# Patient Record
Sex: FEMALE | Race: WHITE | NOT HISPANIC OR LATINO | Employment: PART TIME | URBAN - METROPOLITAN AREA
[De-identification: names, ages, dates, MRNs, and addresses within clinical notes are randomized per-mention and may not be internally consistent; named-entity substitution may affect disease eponyms.]

---

## 2024-07-03 ENCOUNTER — NURSE TRIAGE (OUTPATIENT)
Dept: CALL CENTER | Facility: HOSPITAL | Age: 19
End: 2024-07-03
Payer: MEDICAID

## 2024-07-03 NOTE — TELEPHONE ENCOUNTER
"Reason for Disposition  • Nursing judgment    Additional Information  • Negative: Sounds like a life-threatening emergency to the triager  • Negative: Information only call about a Well Adult (no illness or injury)  • Negative: Caller can't be reached by phone  • Negative: Nursing judgment  • Negative: Nursing judgment  • Negative: Nursing judgment  • Negative: Nursing judgment  • Negative: Nursing judgment  • Negative: Nursing judgment  • Negative: Patient wants to be seen  • Negative: Nursing judgment  • Negative: Nursing judgment  • Negative: Nursing judgment  • Negative: Nursing judgment    Answer Assessment - Initial Assessment Questions  1. REASON FOR CALL: \"What is your main concern right now?\"      Want physical   2. ONSET: \"When did the  start?\"      months  3. SEVERITY: \"How bad is the *No Answer*?\"      na  4. FEVER: \"Do you have a fever?\"      na  5. OTHER SYMPTOMS: \"Do you have any other new symptoms?\"      Dizziness and ha  6. TREATMENTS AND RESPONSE: \"What have you done so far to try to make this better? What medicines have you used?\"      na  7. PREGNANCY: \"Is there any chance you are pregnant?\" \"When was your last menstrual period?\"      na    Protocols used: No Protocol Available-ADULT-[place]    "

## 2024-07-15 ENCOUNTER — OFFICE VISIT (OUTPATIENT)
Dept: FAMILY MEDICINE CLINIC | Facility: CLINIC | Age: 19
End: 2024-07-15
Payer: MEDICAID

## 2024-07-15 VITALS
OXYGEN SATURATION: 100 % | HEART RATE: 96 BPM | DIASTOLIC BLOOD PRESSURE: 72 MMHG | WEIGHT: 124.2 LBS | BODY MASS INDEX: 21.21 KG/M2 | TEMPERATURE: 97.6 F | HEIGHT: 64 IN | SYSTOLIC BLOOD PRESSURE: 112 MMHG

## 2024-07-15 DIAGNOSIS — D64.9 ANEMIA, UNSPECIFIED TYPE: ICD-10-CM

## 2024-07-15 DIAGNOSIS — I73.00 RAYNAUD'S PHENOMENON WITHOUT GANGRENE: ICD-10-CM

## 2024-07-15 DIAGNOSIS — Z13.29 SCREENING FOR THYROID DISORDER: ICD-10-CM

## 2024-07-15 DIAGNOSIS — Z11.59 NEED FOR HEPATITIS C SCREENING TEST: ICD-10-CM

## 2024-07-15 DIAGNOSIS — F41.9 ANXIETY: ICD-10-CM

## 2024-07-15 DIAGNOSIS — Z76.89 ENCOUNTER TO ESTABLISH CARE: Primary | ICD-10-CM

## 2024-07-15 PROCEDURE — 99204 OFFICE O/P NEW MOD 45 MIN: CPT | Performed by: PHYSICIAN ASSISTANT

## 2024-07-15 PROCEDURE — 1126F AMNT PAIN NOTED NONE PRSNT: CPT | Performed by: PHYSICIAN ASSISTANT

## 2024-07-15 RX ORDER — NORETHINDRONE ACETATE AND ETHINYL ESTRADIOL AND FERROUS FUMARATE 1.5-30(21)
KIT ORAL
COMMUNITY
Start: 2024-05-21

## 2024-07-15 NOTE — PROGRESS NOTES
New Patient Office Visit      Date: 07/15/2024   Patient Name: Prashanth Varela  : 2005   MRN: [id number]     Chief Complaint:    Chief Complaint   Patient presents with    Establish Care       History of Present Illness: Prashanth Varela is a 18 y.o. female who is here today to establish care.    History of Present Illness  Currently, she does not have a primary care physician because she was previously seen a pediatrician.   She has chronic stomach issues, including persistent nausea. Despite medication, her symptoms have not improved. She has consulted a GI specialist at , who conducted colonoscopy and endoscopy, but did not find anything. She finds it challenging to gain weight, but she has not lost weight recently. She vomits a few times a week. Her mother suspects her nausea may be related to blood sugar levels, as she often vomits even after eating. .    She occasionally experiences a rapid heart rate, followed by a brief drop in heart rate, and numbness in her arm. She has a history of anxiety, but is not currently on any medication. She is aware of potential symptoms for her anxiety, including difficulty breathing and chest pain while sitting in a car, which can last up to 10 minutes. These episodes are never associated with physical activity. She is not currently seeing a counselor due to missing several appointments. She denies depression.    She experiences frequent dizziness, which has been ongoing for over 4 years. Recently, she has been experiencing severe headaches, which feels like it is behind her eyes and sometimes it hurts her neck. Sometimes, she has to close her eyes and sit for a minute if she is standing for too long. She recently had her eyes checked and got glasses, but she does not think they are the right prescription. She suspects anemia may be causing her dizziness. Her legs turn blue and purple when standing, and her feet looked discolored and cold or hot temperatures.  She maintains good hydration and has taken iron and multivitamins once, but did not notice any difference. She occasionally takes multivitamins.       Subjective      Review of Systems:   Review of Systems   Constitutional:  Negative for unexpected weight gain and unexpected weight loss.   Eyes:  Negative for blurred vision, pain and visual disturbance.        She is up-to-date on eye exam   Gastrointestinal:  Positive for nausea (Chronic, and currently stable). Negative for vomiting.   Genitourinary:  Negative for menstrual problem.   Musculoskeletal:  Positive for neck pain. Negative for arthralgias and joint swelling.   Neurological:  Positive for light-headedness (Upon standing) and headache. Negative for syncope and numbness.   Psychiatric/Behavioral:  The patient is nervous/anxious.        Past Medical History: History reviewed. No pertinent past medical history.    Past Surgical History: History reviewed. No pertinent surgical history.    Family History:   Family History   Problem Relation Age of Onset    Melanoma Mother     No Known Problems Father     Crohn's disease Sister     Ulcerative colitis Sister     Diabetes Paternal Grandmother     Diabetes Paternal Grandfather        Social History:   Social History     Socioeconomic History    Marital status: Single   Tobacco Use    Smoking status: Never    Smokeless tobacco: Never   Vaping Use    Vaping status: Never Used   Substance and Sexual Activity    Alcohol use: Never    Drug use: Yes     Types: Marijuana    Sexual activity: Never       Medications:     Current Outpatient Medications:     Junel FE 1.5/30 1.5-30 MG-MCG tablet, , Disp: , Rfl:     Allergies:   No Known Allergies    Objective     Physical Exam:  Vital Signs:   Vitals:    07/15/24 1515 07/15/24 1546   BP: 136/76 112/72   BP Location: Left arm Left arm   Patient Position: Sitting Sitting   Cuff Size: Adult    Pulse: 96    Temp: 97.6 °F (36.4 °C)    TempSrc: Infrared    SpO2: 100%    Weight:  "56.3 kg (124 lb 3.2 oz)    Height: 162.6 cm (64\")    PainSc: 0-No pain      Body mass index is 21.32 kg/m².  Pediatric BMI = 46 %ile (Z= -0.11) based on CDC (Girls, 2-20 Years) BMI-for-age based on BMI available as of 7/15/2024.. BMI is within normal parameters. No other follow-up for BMI required.       Physical Exam  Vitals (Blood pressure mildly elevated at the beginning of the visit, but it improved significantly when repeated) and nursing note reviewed.   Constitutional:       General: She is not in acute distress.     Appearance: Normal appearance. She is not ill-appearing.   HENT:      Head: Normocephalic and atraumatic.   Eyes:      Extraocular Movements: Extraocular movements intact.      Conjunctiva/sclera: Conjunctivae normal.      Pupils: Pupils are equal, round, and reactive to light.   Cardiovascular:      Rate and Rhythm: Normal rate and regular rhythm.      Pulses: Normal pulses.      Heart sounds: Normal heart sounds.   Pulmonary:      Effort: Pulmonary effort is normal. No respiratory distress.      Breath sounds: Normal breath sounds.   Musculoskeletal:      Right lower leg: No edema.      Left lower leg: No edema.   Skin:     General: Skin is warm and dry.   Neurological:      General: No focal deficit present.      Mental Status: She is alert and oriented to person, place, and time.      Coordination: Coordination normal.      Gait: Gait normal.   Psychiatric:         Mood and Affect: Mood normal.         Behavior: Behavior normal.       Physical Exam    Results:   PHQ-9 Total Score: 0        Results    Assessment / Plan      Assessment/Plan:   Assessment & Plan      Diagnoses and all orders for this visit:    1. Encounter to establish care (Primary)  Comments:  Baseline blood work drawn on today's visit.  Orders:  -     Lipid Panel; Future  -     Hemoglobin A1c; Future  -     Comprehensive Metabolic Panel; Future  -     CBC Auto Differential; Future  -     Iron Profile; Future  -     Lipid " Panel  -     Hemoglobin A1c  -     Comprehensive Metabolic Panel  -     CBC Auto Differential  -     Iron Profile    2. Anxiety  Assessment & Plan:  Her anxiety is chronic and has been stable without medication.  I advised that some of her symptoms may be related to anxiety, so I recommend that she consider resuming counseling since it has improved symptoms in the past.  She is in agreement with this.      3. Raynaud's phenomenon without gangrene  Assessment & Plan:  She has had some episodes with characteristics of Raynaud's.  I will check a rheumatoid panel on her labs to monitor for an autoimmune connection.    Orders:  -     BHAVESH by IFA, Reflex to Titer and Pattern  -     Cyclic Citrul Peptide Antibody, IgG / IgA; Future  -     Rheumatoid Factor; Future  -     Sedimentation Rate; Future  -     C-reactive Protein; Future  -     Cyclic Citrul Peptide Antibody, IgG / IgA  -     Rheumatoid Factor  -     Sedimentation Rate  -     C-reactive Protein    4. Anemia, unspecified type  Assessment & Plan:  She has a history of anemia as well as some symptoms, so I will check her iron levels on today's labs.    Orders:  -     CBC Auto Differential; Future  -     Iron Profile; Future  -     CBC Auto Differential  -     Iron Profile    5. Need for hepatitis C screening test  -     HCV Antibody Rfx To Qnt PCR; Future  -     HCV Antibody Rfx To Qnt PCR    6. Screening for thyroid disorder  -     Thyroid Cascade Profile; Future  -     Thyroid Cascade Profile         Follow Up:   Return for recheck in 3-4 weeks.    Caro Batista PA-C  [facility]    Patient or patient representative verbalized consent for the use of Ambient Listening during the visit with  Caro Batista PA-C for chart documentation. 7/15/2024  22:56 EDT

## 2024-07-16 LAB
ALBUMIN SERPL-MCNC: 4.6 G/DL (ref 4–5)
ALP SERPL-CCNC: 49 IU/L (ref 42–106)
ALT SERPL-CCNC: 18 IU/L (ref 0–32)
AST SERPL-CCNC: 19 IU/L (ref 0–40)
BASOPHILS # BLD AUTO: 0 X10E3/UL (ref 0–0.2)
BASOPHILS NFR BLD AUTO: 1 %
BILIRUB SERPL-MCNC: 0.5 MG/DL (ref 0–1.2)
BUN SERPL-MCNC: 8 MG/DL (ref 6–20)
BUN/CREAT SERPL: 11 (ref 9–23)
CALCIUM SERPL-MCNC: 9.7 MG/DL (ref 8.7–10.2)
CCP IGA+IGG SERPL IA-ACNC: 10 UNITS (ref 0–19)
CHLORIDE SERPL-SCNC: 104 MMOL/L (ref 96–106)
CHOLEST SERPL-MCNC: 152 MG/DL (ref 100–169)
CO2 SERPL-SCNC: 22 MMOL/L (ref 20–29)
CREAT SERPL-MCNC: 0.73 MG/DL (ref 0.57–1)
CRP SERPL-MCNC: 2 MG/L (ref 0–10)
EGFRCR SERPLBLD CKD-EPI 2021: 121 ML/MIN/1.73
EOSINOPHIL # BLD AUTO: 0.1 X10E3/UL (ref 0–0.4)
EOSINOPHIL NFR BLD AUTO: 1 %
ERYTHROCYTE [DISTWIDTH] IN BLOOD BY AUTOMATED COUNT: 12.6 % (ref 11.7–15.4)
ERYTHROCYTE [SEDIMENTATION RATE] IN BLOOD BY WESTERGREN METHOD: 2 MM/HR (ref 0–32)
GLOBULIN SER CALC-MCNC: 3.1 G/DL (ref 1.5–4.5)
GLUCOSE SERPL-MCNC: 87 MG/DL (ref 70–99)
HBA1C MFR BLD: 5.1 % (ref 4.8–5.6)
HCT VFR BLD AUTO: 41.6 % (ref 34–46.6)
HCV AB SERPL QL IA: NORMAL
HCV IGG SERPL QL IA: NON REACTIVE
HDLC SERPL-MCNC: 58 MG/DL
HGB BLD-MCNC: 13.5 G/DL (ref 11.1–15.9)
IMM GRANULOCYTES # BLD AUTO: 0 X10E3/UL (ref 0–0.1)
IMM GRANULOCYTES NFR BLD AUTO: 0 %
IRON SATN MFR SERPL: 39 % (ref 15–55)
IRON SERPL-MCNC: 187 UG/DL (ref 27–159)
LDLC SERPL CALC-MCNC: 83 MG/DL (ref 0–109)
LYMPHOCYTES # BLD AUTO: 2.4 X10E3/UL (ref 0.7–3.1)
LYMPHOCYTES NFR BLD AUTO: 39 %
MCH RBC QN AUTO: 31 PG (ref 26.6–33)
MCHC RBC AUTO-ENTMCNC: 32.5 G/DL (ref 31.5–35.7)
MCV RBC AUTO: 96 FL (ref 79–97)
MONOCYTES # BLD AUTO: 0.4 X10E3/UL (ref 0.1–0.9)
MONOCYTES NFR BLD AUTO: 7 %
NEUTROPHILS # BLD AUTO: 3.2 X10E3/UL (ref 1.4–7)
NEUTROPHILS NFR BLD AUTO: 52 %
PLATELET # BLD AUTO: 287 X10E3/UL (ref 150–450)
POTASSIUM SERPL-SCNC: 4.1 MMOL/L (ref 3.5–5.2)
PROT SERPL-MCNC: 7.7 G/DL (ref 6–8.5)
RBC # BLD AUTO: 4.35 X10E6/UL (ref 3.77–5.28)
RHEUMATOID FACT SERPL-ACNC: <10 IU/ML
SODIUM SERPL-SCNC: 140 MMOL/L (ref 134–144)
TIBC SERPL-MCNC: 482 UG/DL (ref 250–450)
TRIGL SERPL-MCNC: 54 MG/DL (ref 0–89)
TSH SERPL DL<=0.005 MIU/L-ACNC: 2.47 UIU/ML (ref 0.45–4.5)
UIBC SERPL-MCNC: 295 UG/DL (ref 131–425)
VLDLC SERPL CALC-MCNC: 11 MG/DL (ref 5–40)
WBC # BLD AUTO: 6.1 X10E3/UL (ref 3.4–10.8)

## 2024-07-16 NOTE — ASSESSMENT & PLAN NOTE
She has a history of anemia as well as some symptoms, so I will check her iron levels on today's labs.

## 2024-07-16 NOTE — ASSESSMENT & PLAN NOTE
Her anxiety is chronic and has been stable without medication.  I advised that some of her symptoms may be related to anxiety, so I recommend that she consider resuming counseling since it has improved symptoms in the past.  She is in agreement with this.

## 2024-07-16 NOTE — ASSESSMENT & PLAN NOTE
She has had some episodes with characteristics of Raynaud's.  I will check a rheumatoid panel on her labs to monitor for an autoimmune connection.

## 2024-07-17 LAB
ANA SER QL IF: POSITIVE
ANA SPECKLED TITR SER: ABNORMAL {TITER}
Lab: ABNORMAL

## 2024-08-02 DIAGNOSIS — R76.8 POSITIVE ANA (ANTINUCLEAR ANTIBODY): Primary | ICD-10-CM

## 2024-08-02 DIAGNOSIS — I73.00 RAYNAUD'S PHENOMENON WITHOUT GANGRENE: ICD-10-CM

## 2024-08-19 ENCOUNTER — OFFICE VISIT (OUTPATIENT)
Dept: FAMILY MEDICINE CLINIC | Facility: CLINIC | Age: 19
End: 2024-08-19
Payer: MEDICAID

## 2024-08-19 VITALS
RESPIRATION RATE: 14 BRPM | DIASTOLIC BLOOD PRESSURE: 68 MMHG | SYSTOLIC BLOOD PRESSURE: 106 MMHG | WEIGHT: 122.5 LBS | OXYGEN SATURATION: 96 % | TEMPERATURE: 98.4 F | HEIGHT: 64 IN | HEART RATE: 73 BPM | BODY MASS INDEX: 20.92 KG/M2

## 2024-08-19 DIAGNOSIS — R79.0 ABNORMAL SERUM IRON LEVEL: ICD-10-CM

## 2024-08-19 DIAGNOSIS — R19.8 GASTROINTESTINAL COMPLAINTS: ICD-10-CM

## 2024-08-19 DIAGNOSIS — F41.9 ANXIETY: ICD-10-CM

## 2024-08-19 DIAGNOSIS — R76.8 POSITIVE ANA (ANTINUCLEAR ANTIBODY): Primary | ICD-10-CM

## 2024-08-19 PROCEDURE — 1160F RVW MEDS BY RX/DR IN RCRD: CPT | Performed by: PHYSICIAN ASSISTANT

## 2024-08-19 PROCEDURE — 1126F AMNT PAIN NOTED NONE PRSNT: CPT | Performed by: PHYSICIAN ASSISTANT

## 2024-08-19 PROCEDURE — 99214 OFFICE O/P EST MOD 30 MIN: CPT | Performed by: PHYSICIAN ASSISTANT

## 2024-08-19 PROCEDURE — 1159F MED LIST DOCD IN RCRD: CPT | Performed by: PHYSICIAN ASSISTANT

## 2024-08-19 NOTE — PATIENT INSTRUCTIONS
Local Options for Counseling:  [facility]- [phone]  [facility]- [place] Office- [phone]  [facility]- [phone]  [facility]- [phone]

## 2024-08-19 NOTE — PROGRESS NOTES
Office Note     Name: Prashanth Varela    : 2005     MRN: [id number]     Chief Complaint  Follow-up (3-4 wk f/u)    Subjective   Patient or patient representative verbalized consent for the use of Ambient Listening during the visit with  Caro Batista PA-C for chart documentation. 2024  14:42 EDT      Prashanth Varela is a 18 y.o. female.     She reports feeling lightheaded and experiencing poor sleep quality for the past month and a half. Her sleep is frequently interrupted, waking up every 1 to 2 hours, and she often has difficulty falling back asleep. She feels more fatigued and dizzy, but is uncertain of the cause. She struggles with daily tasks and comprehension, and finds herself becoming agitated more quickly.  She has previously seen mental health specialists and found medication helpful. She is considering resuming this treatment and is in the process of finding a new provider, as her previous doctor is no longer available.    She mentions that her stomach issues have recently worsened. She has not had a follow-up with  for about 2 years. She is considering seeking help from a specialist, as she was previously referred to one during her last visit to . She is unsure if a follow-up appointment was scheduled.    Review of Systems:   Review of Systems   All other systems reviewed and are negative.      Past Medical History:   Past Medical History:   Diagnosis Date    Anxiety 07/15/2024    H/O iron deficiency 07/15/2024       Past Surgical History: History reviewed. No pertinent surgical history.    Family History:   Family History   Problem Relation Age of Onset    Melanoma Mother     No Known Problems Father     Crohn's disease Sister     Ulcerative colitis Sister     Diabetes Paternal Grandmother     Diabetes Paternal Grandfather        Social History:   Social History     Socioeconomic History    Marital status: Single   Tobacco Use    Smoking status: Never    Smokeless tobacco:  "Never   Vaping Use    Vaping status: Never Used   Substance and Sexual Activity    Alcohol use: Never    Drug use: Yes     Types: Marijuana    Sexual activity: Never       Immunizations:   Immunization History   Administered Date(s) Administered    COVID-19 (PFIZER) Purple Cap Monovalent 01/07/2022, 01/28/2022        Medications:     Current Outpatient Medications:     Junel FE 1.5/30 1.5-30 MG-MCG tablet, , Disp: , Rfl:     Allergies:   No Known Allergies    Objective     Vital Signs  /68 (BP Location: Left arm, Patient Position: Sitting, Cuff Size: Adult)   Pulse 73   Temp 98.4 °F (36.9 °C) (Oral)   Resp 14   Ht 162.6 cm (64.02\")   Wt 55.6 kg (122 lb 8 oz)   SpO2 96%   BMI 21.02 kg/m²   Estimated body mass index is 21.02 kg/m² as calculated from the following:    Height as of this encounter: 162.6 cm (64.02\").    Weight as of this encounter: 55.6 kg (122 lb 8 oz).    Pediatric BMI = 42 %ile (Z= -0.21) based on CDC (Girls, 2-20 Years) BMI-for-age based on BMI available as of 8/19/2024.. BMI is within normal parameters. No other follow-up for BMI required.      Physical Exam  Vitals and nursing note reviewed.   Constitutional:       General: She is not in acute distress.     Appearance: Normal appearance. She is not ill-appearing.   HENT:      Head: Normocephalic and atraumatic.   Eyes:      Extraocular Movements: Extraocular movements intact.      Conjunctiva/sclera: Conjunctivae normal.      Pupils: Pupils are equal, round, and reactive to light.   Cardiovascular:      Rate and Rhythm: Normal rate and regular rhythm.      Pulses: Normal pulses.      Heart sounds: Normal heart sounds.   Pulmonary:      Effort: Pulmonary effort is normal. No respiratory distress.      Breath sounds: Normal breath sounds.   Musculoskeletal:      Right lower leg: No edema.      Left lower leg: No edema.   Skin:     General: Skin is warm and dry.   Neurological:      General: No focal deficit present.      Mental Status: " She is alert and oriented to person, place, and time.      Coordination: Coordination normal.      Gait: Gait normal.   Psychiatric:         Mood and Affect: Mood normal.         Behavior: Behavior normal.        Results:  No results found for this or any previous visit (from the past 24 hour(s)).      BHAVESH by IFA, Reflex to Titer and Pattern    Collection Time: 07/15/24  4:07 PM    Specimen: Arm, Right; Blood   Result Value Ref Range    BHAVESH Positive (A)    HCV Antibody Rfx To Qnt PCR    Collection Time: 07/15/24  4:07 PM    Specimen: Arm, Right; Blood   Result Value Ref Range    Hepatitis C Ab Non Reactive Non Reactive   Thyroid Cascade Profile    Collection Time: 07/15/24  4:07 PM    Specimen: Arm, Right; Blood   Result Value Ref Range    TSH 2.470 0.450 - 4.500 uIU/mL   Lipid Panel    Collection Time: 07/15/24  4:07 PM    Specimen: Arm, Right; Blood   Result Value Ref Range    Total Cholesterol 152 100 - 169 mg/dL    Triglycerides 54 0 - 89 mg/dL    HDL Cholesterol 58 >39 mg/dL    VLDL Cholesterol Renny 11 5 - 40 mg/dL    LDL Chol Calc (NIH) 83 0 - 109 mg/dL   Hemoglobin A1c    Collection Time: 07/15/24  4:07 PM    Specimen: Arm, Right; Blood   Result Value Ref Range    Hemoglobin A1C 5.1 4.8 - 5.6 %   Comprehensive Metabolic Panel    Collection Time: 07/15/24  4:07 PM    Specimen: Arm, Right; Blood   Result Value Ref Range    Glucose 87 70 - 99 mg/dL    BUN 8 6 - 20 mg/dL    Creatinine 0.73 0.57 - 1.00 mg/dL    EGFR Result 121 >59 mL/min/1.73    BUN/Creatinine Ratio 11 9 - 23    Sodium 140 134 - 144 mmol/L    Potassium 4.1 3.5 - 5.2 mmol/L    Chloride 104 96 - 106 mmol/L    Total CO2 22 20 - 29 mmol/L    Calcium 9.7 8.7 - 10.2 mg/dL    Total Protein 7.7 6.0 - 8.5 g/dL    Albumin 4.6 4.0 - 5.0 g/dL    Globulin 3.1 1.5 - 4.5 g/dL    Total Bilirubin 0.5 0.0 - 1.2 mg/dL    Alkaline Phosphatase 49 42 - 106 IU/L    AST (SGOT) 19 0 - 40 IU/L    ALT (SGPT) 18 0 - 32 IU/L   CBC Auto Differential    Collection Time: 07/15/24   4:07 PM    Specimen: Arm, Right; Blood   Result Value Ref Range    WBC 6.1 3.4 - 10.8 x10E3/uL    RBC 4.35 3.77 - 5.28 x10E6/uL    Hemoglobin 13.5 11.1 - 15.9 g/dL    Hematocrit 41.6 34.0 - 46.6 %    MCV 96 79 - 97 fL    MCH 31.0 26.6 - 33.0 pg    MCHC 32.5 31.5 - 35.7 g/dL    RDW 12.6 11.7 - 15.4 %    Platelets 287 150 - 450 x10E3/uL    Neutrophil Rel % 52 Not Estab. %    Lymphocyte Rel % 39 Not Estab. %    Monocyte Rel % 7 Not Estab. %    Eosinophil Rel % 1 Not Estab. %    Basophil Rel % 1 Not Estab. %    Neutrophils Absolute 3.2 1.4 - 7.0 x10E3/uL    Lymphocytes Absolute 2.4 0.7 - 3.1 x10E3/uL    Monocytes Absolute 0.4 0.1 - 0.9 x10E3/uL    Eosinophils Absolute 0.1 0.0 - 0.4 x10E3/uL    Basophils Absolute 0.0 0.0 - 0.2 x10E3/uL    Immature Granulocyte Rel % 0 Not Estab. %    Immature Grans Absolute 0.0 0.0 - 0.1 x10E3/uL   Iron Profile    Collection Time: 07/15/24  4:07 PM    Specimen: Arm, Right; Blood   Result Value Ref Range    TIBC 482 (H) 250 - 450 ug/dL    UIBC 295 131 - 425 ug/dL    Iron 187 (H) 27 - 159 ug/dL    Iron Saturation 39 15 - 55 %   Cyclic Citrul Peptide Antibody, IgG / IgA    Collection Time: 07/15/24  4:07 PM    Specimen: Arm, Right; Blood   Result Value Ref Range    CCP Antibodies IgG/IgA 10 0 - 19 units   Rheumatoid Factor    Collection Time: 07/15/24  4:07 PM    Specimen: Arm, Right; Blood   Result Value Ref Range    RA Latex Turbid <10.0 <14.0 IU/mL   Sedimentation Rate    Collection Time: 07/15/24  4:07 PM    Specimen: Arm, Right; Blood   Result Value Ref Range    Sed Rate 2 0 - 32 mm/hr   C-reactive Protein    Collection Time: 07/15/24  4:07 PM    Specimen: Arm, Right; Blood   Result Value Ref Range    C-Reactive Protein 2 0 - 10 mg/L   Interpretation:    Collection Time: 07/15/24  4:07 PM    Specimen: Arm, Right; Blood   Result Value Ref Range    Interpretation Comment    RANJAN Staining Patterns    Collection Time: 07/15/24  4:07 PM    Specimen: Arm, Right; Blood   Result Value Ref  Range    Speckled Pattern 1:640 (H)     Note: (Reference) Comment         Assessment and Plan       Diagnoses and all orders for this visit:    1. Positive BHAVESH (antinuclear antibody) (Primary)  Assessment & Plan:  Her BHAVESH was positive with high titers on her blood work.  This could be related to many of her symptoms.  I recommend that she keep the appointment with the rheumatologist for further evaluation.  She is in agreement with this plan.      2. Anxiety  Assessment & Plan:  She is experiencing some anxiety and sleep disturbance, which is affecting her daily functioning, causing fatigue, agitation, and difficulty concentrating. She is advised to seek consultation with a mental health specialist. Contact information for local mental health facilities has been provided. Behavioral health services through [facility] in [place] are also available if needed.  She expressed understanding and is in agreement.      3. Gastrointestinal complaints  Comments:  She has not f/u with [facility] for 1-2 years.Referral to GI can be made if she decides to pursue it.    4. Abnormal serum iron level  Assessment & Plan:  Her iron panel showed some elevated levels, which could be indicative of an inflammatory process. This may be associated with her symptoms. A repeat iron panel will be conducted in a few months to monitor.  She expressed understanding, and she is in agreement with this plan.          Follow Up  Return for Annual Physical i n3-6 months.    Caro Batista PA-C  [facility] Internal Medicine [facility]

## 2024-08-31 PROBLEM — Z86.39 H/O IRON DEFICIENCY: Status: RESOLVED | Noted: 2024-07-15 | Resolved: 2024-08-31

## 2024-08-31 PROBLEM — R79.0 ABNORMAL SERUM IRON LEVEL: Status: ACTIVE | Noted: 2024-08-31

## 2024-08-31 PROBLEM — R19.8 GASTROINTESTINAL COMPLAINTS: Status: ACTIVE | Noted: 2024-08-31

## 2024-08-31 PROBLEM — Z86.39 H/O IRON DEFICIENCY: Status: ACTIVE | Noted: 2024-07-15

## 2024-08-31 PROBLEM — R19.8 GASTROINTESTINAL COMPLAINTS: Status: RESOLVED | Noted: 2024-08-31 | Resolved: 2024-08-31

## 2024-08-31 PROBLEM — F41.9 ANXIETY: Status: RESOLVED | Noted: 2024-07-15 | Resolved: 2024-08-31

## 2024-08-31 NOTE — ASSESSMENT & PLAN NOTE
She is experiencing some anxiety and sleep disturbance, which is affecting her daily functioning, causing fatigue, agitation, and difficulty concentrating. She is advised to seek consultation with a mental health specialist. Contact information for local mental health facilities has been provided. Behavioral health services through [facility] in [place] are also available if needed.  She expressed understanding and is in agreement.

## 2024-08-31 NOTE — ASSESSMENT & PLAN NOTE
Her BHAVESH was positive with high titers on her blood work.  This could be related to many of her symptoms.  I recommend that she keep the appointment with the rheumatologist for further evaluation.  She is in agreement with this plan.

## 2024-08-31 NOTE — ASSESSMENT & PLAN NOTE
Her iron panel showed some elevated levels, which could be indicative of an inflammatory process. This may be associated with her symptoms. A repeat iron panel will be conducted in a few months to monitor.  She expressed understanding, and she is in agreement with this plan.

## 2024-09-24 ENCOUNTER — OFFICE VISIT (OUTPATIENT)
Dept: FAMILY MEDICINE CLINIC | Facility: CLINIC | Age: 19
End: 2024-09-24
Payer: MEDICAID

## 2024-09-24 VITALS
OXYGEN SATURATION: 99 % | HEIGHT: 64 IN | DIASTOLIC BLOOD PRESSURE: 84 MMHG | WEIGHT: 123.8 LBS | RESPIRATION RATE: 22 BRPM | BODY MASS INDEX: 21.13 KG/M2 | HEART RATE: 102 BPM | SYSTOLIC BLOOD PRESSURE: 132 MMHG

## 2024-09-24 DIAGNOSIS — M94.0 COSTOCHONDRITIS, ACUTE: ICD-10-CM

## 2024-09-24 DIAGNOSIS — R07.9 CHEST PAIN, UNSPECIFIED TYPE: Primary | ICD-10-CM

## 2024-09-24 PROBLEM — R07.89 OTHER CHEST PAIN: Status: ACTIVE | Noted: 2024-09-24

## 2024-09-24 PROCEDURE — 1159F MED LIST DOCD IN RCRD: CPT | Performed by: PHYSICIAN ASSISTANT

## 2024-09-24 PROCEDURE — 93000 ELECTROCARDIOGRAM COMPLETE: CPT | Performed by: PHYSICIAN ASSISTANT

## 2024-09-24 PROCEDURE — 99214 OFFICE O/P EST MOD 30 MIN: CPT | Performed by: PHYSICIAN ASSISTANT

## 2024-09-24 PROCEDURE — 1160F RVW MEDS BY RX/DR IN RCRD: CPT | Performed by: PHYSICIAN ASSISTANT

## 2024-09-24 PROCEDURE — 96372 THER/PROPH/DIAG INJ SC/IM: CPT | Performed by: PHYSICIAN ASSISTANT

## 2024-09-24 PROCEDURE — 1126F AMNT PAIN NOTED NONE PRSNT: CPT | Performed by: PHYSICIAN ASSISTANT

## 2024-09-24 RX ORDER — ONDANSETRON 4 MG/1
4 TABLET, ORALLY DISINTEGRATING ORAL EVERY 8 HOURS PRN
COMMUNITY
End: 2024-09-24

## 2024-09-24 RX ORDER — KETOROLAC TROMETHAMINE 30 MG/ML
60 INJECTION, SOLUTION INTRAMUSCULAR; INTRAVENOUS
Status: DISCONTINUED | OUTPATIENT
Start: 2024-09-24 | End: 2024-09-24

## 2024-09-24 RX ORDER — TRIAMCINOLONE ACETONIDE 40 MG/ML
80 INJECTION, SUSPENSION INTRA-ARTICULAR; INTRAMUSCULAR ONCE
Status: DISCONTINUED | OUTPATIENT
Start: 2024-09-24 | End: 2024-09-24

## 2024-09-24 RX ORDER — DEXTROAMPHETAMINE SACCHARATE, AMPHETAMINE ASPARTATE MONOHYDRATE, DEXTROAMPHETAMINE SULFATE AND AMPHETAMINE SULFATE 3.75; 3.75; 3.75; 3.75 MG/1; MG/1; MG/1; MG/1
15 CAPSULE, EXTENDED RELEASE ORAL EVERY MORNING
COMMUNITY
End: 2024-09-24

## 2024-09-24 RX ORDER — BUSPIRONE HYDROCHLORIDE 15 MG/1
15 TABLET ORAL
COMMUNITY
End: 2024-09-24

## 2024-09-24 RX ORDER — OMEPRAZOLE 40 MG/1
40 CAPSULE, DELAYED RELEASE ORAL DAILY
COMMUNITY
End: 2024-09-24

## 2024-09-24 RX ADMIN — TRIAMCINOLONE ACETONIDE 80 MG: 40 INJECTION, SUSPENSION INTRA-ARTICULAR; INTRAMUSCULAR at 11:00

## 2024-09-24 RX ADMIN — KETOROLAC TROMETHAMINE 60 MG: 30 INJECTION, SOLUTION INTRAMUSCULAR; INTRAVENOUS at 10:59

## 2024-09-25 ENCOUNTER — TELEPHONE (OUTPATIENT)
Dept: CARDIOLOGY | Facility: CLINIC | Age: 19
End: 2024-09-25

## 2024-09-25 ENCOUNTER — OFFICE VISIT (OUTPATIENT)
Dept: CARDIOLOGY | Facility: CLINIC | Age: 19
End: 2024-09-25
Payer: MEDICAID

## 2024-09-25 VITALS
DIASTOLIC BLOOD PRESSURE: 72 MMHG | OXYGEN SATURATION: 99 % | HEIGHT: 64 IN | WEIGHT: 123.6 LBS | BODY MASS INDEX: 21.1 KG/M2 | SYSTOLIC BLOOD PRESSURE: 124 MMHG | HEART RATE: 91 BPM | RESPIRATION RATE: 16 BRPM

## 2024-09-25 DIAGNOSIS — R07.9 CHEST PAIN, UNSPECIFIED TYPE: Primary | ICD-10-CM

## 2024-09-25 DIAGNOSIS — R06.02 SHORTNESS OF BREATH: ICD-10-CM

## 2024-09-25 DIAGNOSIS — I73.00 RAYNAUD'S PHENOMENON WITHOUT GANGRENE: ICD-10-CM

## 2024-09-25 PROCEDURE — 99204 OFFICE O/P NEW MOD 45 MIN: CPT | Performed by: INTERNAL MEDICINE

## 2024-09-25 PROCEDURE — 1160F RVW MEDS BY RX/DR IN RCRD: CPT | Performed by: INTERNAL MEDICINE

## 2024-09-25 PROCEDURE — 1159F MED LIST DOCD IN RCRD: CPT | Performed by: INTERNAL MEDICINE

## 2024-09-25 NOTE — TELEPHONE ENCOUNTER
"  Caller: Prashanth Varela \"GIANCARLO\"    Relationship: Self    Best call back number: [phone]    What form or medical record are you requesting: WORK EXCUSE    Who is requesting this form or medical record from you: ELLIS    How would you like to receive the form or medical records (pick-up, mail, fax):   If fax, what is the fax number:   If mail, what is the address:   If pick-up, provide patient with address and location details    Timeframe paperwork needed:     Additional notes: PLEASE ENTER IN MY CHART. PATIENT FORGOT TO REQUEST        "

## 2024-09-27 ENCOUNTER — PATIENT ROUNDING (BHMG ONLY) (OUTPATIENT)
Dept: CARDIOLOGY | Facility: CLINIC | Age: 19
End: 2024-09-27
Payer: MEDICAID

## 2024-09-30 ENCOUNTER — OFFICE VISIT (OUTPATIENT)
Dept: FAMILY MEDICINE CLINIC | Facility: CLINIC | Age: 19
End: 2024-09-30
Payer: MEDICAID

## 2024-09-30 VITALS
HEART RATE: 72 BPM | SYSTOLIC BLOOD PRESSURE: 118 MMHG | BODY MASS INDEX: 20.62 KG/M2 | OXYGEN SATURATION: 97 % | WEIGHT: 120.8 LBS | HEIGHT: 64 IN | DIASTOLIC BLOOD PRESSURE: 72 MMHG

## 2024-09-30 DIAGNOSIS — F41.9 ANXIETY: ICD-10-CM

## 2024-09-30 DIAGNOSIS — M94.0 COSTOCHONDRITIS, ACUTE: ICD-10-CM

## 2024-09-30 DIAGNOSIS — R07.9 CHEST PAIN, UNSPECIFIED TYPE: Primary | ICD-10-CM

## 2024-09-30 PROCEDURE — 1159F MED LIST DOCD IN RCRD: CPT | Performed by: PHYSICIAN ASSISTANT

## 2024-09-30 PROCEDURE — 1160F RVW MEDS BY RX/DR IN RCRD: CPT | Performed by: PHYSICIAN ASSISTANT

## 2024-09-30 PROCEDURE — 99214 OFFICE O/P EST MOD 30 MIN: CPT | Performed by: PHYSICIAN ASSISTANT

## 2024-09-30 PROCEDURE — 1126F AMNT PAIN NOTED NONE PRSNT: CPT | Performed by: PHYSICIAN ASSISTANT

## 2024-09-30 RX ORDER — HYDROXYZINE PAMOATE 25 MG/1
CAPSULE ORAL
Qty: 100 CAPSULE | Refills: 3 | Status: SHIPPED | OUTPATIENT
Start: 2024-09-30

## 2024-09-30 RX ORDER — METHOCARBAMOL 750 MG/1
750 TABLET, FILM COATED ORAL 3 TIMES DAILY
Qty: 90 TABLET | Refills: 1 | Status: SHIPPED | OUTPATIENT
Start: 2024-09-30

## 2024-09-30 NOTE — ASSESSMENT & PLAN NOTE
I believe this to possibly be musculoskeletal or due to her anxiety and I am adding a muscle relaxer today and hydroxyzine as well to address both of these problems she can continue the diclofenac gel use of over-the-counter pain relievers such as Motrin, Aleve or Tylenol.  She is also in the midst of a cardiac workup which has thus far been normal she did have an echocardiogram done today and is awaiting those results.  Further recommendations guarding her cardiac status will come from the cardiologist.  She is going to see the rheumatologist in November and I have reassured her that I do not believe that this chest pain is anything worrisome and we will continue to monitor.  She will return in 3 months to see her primary care provider otherwise if she needs to be seen sooner she can certainly schedule an earlier appointment.

## 2024-09-30 NOTE — ASSESSMENT & PLAN NOTE
I am giving her hydroxyzine to use as needed she can take 1 or 2 tablets every 8 hours and going  forward she should follow-up with her primary care.

## 2024-09-30 NOTE — ASSESSMENT & PLAN NOTE
Continue Voltaren gel as needed, adding methocarbamol today and she can take over-the-counter medications for pain relief.

## 2024-09-30 NOTE — PROGRESS NOTES
"Chief Complaint  Chest Pain (Patient is here for a follow up and states her pain is not much better.)    Subjective          Prashanth Varela presents to [facility]    Chest Pain   Pertinent negatives include no cough, palpitations or shortness of breath.    Prashanth is following up today on her chest pain which is thought to be musculoskeletal most likely in nature but I did send her to cardiology for a workup to rule out a cardiac source and she has had D-dimer, normal oxygen level and no signs of PE or spontaneous pneumothorax chest x-ray was normal.  The cardiac workup thus far is unremarkable she did have an echocardiogram today and those results are pending.  She also says it may be anxiety related and today we talked about that possibility she also has an appointment with the rheumatologist as this could be related to whatever autoimmune disorder she may be dealing with.      Objective   Vital Signs:   /72 (BP Location: Left arm, Patient Position: Sitting, Cuff Size: Adult)   Pulse 72   Ht 162.6 cm (64\")   Wt 54.8 kg (120 lb 12.8 oz)   SpO2 97%   BMI 20.74 kg/m²     Body mass index is 20.74 kg/m².    Review of Systems   Respiratory:  Negative for cough, chest tightness, shortness of breath and wheezing.    Cardiovascular:  Positive for chest pain. Negative for palpitations and leg swelling.   Psychiatric/Behavioral:  Negative for self-injury, sleep disturbance, suicidal ideas, depressed mood and stress. The patient is nervous/anxious.          Past History:  Medical History: has a past medical history of Anxiety (07/15/2024) and H/O iron deficiency (07/15/2024).   Surgical History: has no past surgical history on file.   Family History: family history includes Crohn's disease in her sister; Diabetes in her paternal grandfather and paternal grandmother; Melanoma in her mother; No Known Problems in her father; Ulcerative colitis in her sister.   Social History: reports that " she has never smoked. She has never been exposed to tobacco smoke. She has never used smokeless tobacco. She reports current drug use. Drug: Marijuana. She reports that she does not drink alcohol.      Current Outpatient Medications:     Diclofenac Sodium (VOLTAREN) 1 % gel gel, Apply 4 g topically to the appropriate area as directed 4 (Four) Times a Day As Needed (for chest wall tendernes)., Disp: 4 g, Rfl: 0    Junel FE 1.5/30 1.5-30 MG-MCG tablet, , Disp: , Rfl:     hydrOXYzine pamoate (VISTARIL) 25 MG capsule, Take 1 or 2 as needed every 8 hours for anxiety, Disp: 100 capsule, Rfl: 3    methocarbamol (ROBAXIN) 750 MG tablet, Take 1 tablet by mouth 3 (Three) Times a Day., Disp: 90 tablet, Rfl: 1    Allergies: Patient has no known allergies.    Physical Exam  Vitals reviewed.   Constitutional:       Appearance: Normal appearance.   Cardiovascular:      Rate and Rhythm: Normal rate and regular rhythm.      Heart sounds: Normal heart sounds.   Pulmonary:      Effort: Pulmonary effort is normal.      Breath sounds: Normal breath sounds.   Abdominal:      General: Bowel sounds are normal.      Palpations: Abdomen is soft.   Musculoskeletal:         General: Normal range of motion.   Neurological:      General: No focal deficit present.      Mental Status: She is alert and oriented to person, place, and time.   Psychiatric:         Mood and Affect: Mood normal.          Result Review :                   Assessment and Plan    Diagnoses and all orders for this visit:    1. Chest pain, unspecified type (Primary)  Assessment & Plan:  I believe this to possibly be musculoskeletal or due to her anxiety and I am adding a muscle relaxer today and hydroxyzine as well to address both of these problems she can continue the diclofenac gel use of over-the-counter pain relievers such as Motrin, Aleve or Tylenol.  She is also in the midst of a cardiac workup which has thus far been normal she did have an echocardiogram done today  and is awaiting those results.  Further recommendations guarding her cardiac status will come from the cardiologist.  She is going to see the rheumatologist in November and I have reassured her that I do not believe that this chest pain is anything worrisome and we will continue to monitor.  She will return in 3 months to see her primary care provider otherwise if she needs to be seen sooner she can certainly schedule an earlier appointment.      2. Anxiety  Assessment & Plan:  I am giving her hydroxyzine to use as needed she can take 1 or 2 tablets every 8 hours and going  forward she should follow-up with her primary care.      3. Costochondritis, acute  Assessment & Plan:  Continue Voltaren gel as needed, adding methocarbamol today and she can take over-the-counter medications for pain relief.      Other orders  -     methocarbamol (ROBAXIN) 750 MG tablet; Take 1 tablet by mouth 3 (Three) Times a Day.  Dispense: 90 tablet; Refill: 1  -     hydrOXYzine pamoate (VISTARIL) 25 MG capsule; Take 1 or 2 as needed every 8 hours for anxiety  Dispense: 100 capsule; Refill: 3        Follow Up   Return in about 3 months (around 12/30/2024) for With her PCP Caro BERMUDEZ .  Patient was given instructions and counseling regarding her condition or for health maintenance advice. Please see specific information pulled into the AVS if appropriate.     Heather Dai PA-C

## 2024-10-02 ENCOUNTER — TELEPHONE (OUTPATIENT)
Dept: CARDIOLOGY | Facility: CLINIC | Age: 19
End: 2024-10-02
Payer: MEDICAID

## 2024-10-02 NOTE — TELEPHONE ENCOUNTER
----- Message from Gabriel Weir sent at 10/1/2024 10:11 PM EDT -----  Please inform patient that her transthoracic echocardiogram was normal.   Thank you!

## 2025-03-18 ENCOUNTER — LAB (OUTPATIENT)
Facility: HOSPITAL | Age: 20
End: 2025-03-18
Payer: COMMERCIAL

## 2025-03-18 ENCOUNTER — TELEPHONE (OUTPATIENT)
Age: 20
End: 2025-03-18

## 2025-03-18 ENCOUNTER — OFFICE VISIT (OUTPATIENT)
Age: 20
End: 2025-03-18
Payer: COMMERCIAL

## 2025-03-18 VITALS
HEIGHT: 64 IN | BODY MASS INDEX: 21.82 KG/M2 | SYSTOLIC BLOOD PRESSURE: 118 MMHG | DIASTOLIC BLOOD PRESSURE: 76 MMHG | WEIGHT: 127.8 LBS | HEART RATE: 76 BPM | TEMPERATURE: 97.9 F

## 2025-03-18 DIAGNOSIS — R53.83 OTHER FATIGUE: ICD-10-CM

## 2025-03-18 DIAGNOSIS — I73.00 RAYNAUD'S PHENOMENON WITHOUT GANGRENE: ICD-10-CM

## 2025-03-18 DIAGNOSIS — R76.8 POSITIVE ANA (ANTINUCLEAR ANTIBODY): Primary | ICD-10-CM

## 2025-03-18 DIAGNOSIS — R20.0 NUMBNESS AND TINGLING: ICD-10-CM

## 2025-03-18 DIAGNOSIS — R20.2 NUMBNESS AND TINGLING: ICD-10-CM

## 2025-03-18 DIAGNOSIS — R76.8 POSITIVE ANA (ANTINUCLEAR ANTIBODY): ICD-10-CM

## 2025-03-18 DIAGNOSIS — M25.50 ARTHRALGIA OF MULTIPLE SITES: ICD-10-CM

## 2025-03-18 LAB
ALBUMIN SERPL-MCNC: 4.3 G/DL (ref 3.5–5.2)
ALBUMIN/GLOB SERPL: 1.2 G/DL
ALP SERPL-CCNC: 55 U/L (ref 39–117)
ALT SERPL W P-5'-P-CCNC: 15 U/L (ref 1–33)
ANION GAP SERPL CALCULATED.3IONS-SCNC: 13 MMOL/L (ref 5–15)
AST SERPL-CCNC: 24 U/L (ref 1–32)
BACTERIA UR QL AUTO: ABNORMAL /HPF
BASOPHILS # BLD AUTO: 0.02 10*3/MM3 (ref 0–0.2)
BASOPHILS NFR BLD AUTO: 0.4 % (ref 0–1.5)
BILIRUB SERPL-MCNC: 0.4 MG/DL (ref 0–1.2)
BILIRUB UR QL STRIP: NEGATIVE
BUN SERPL-MCNC: 9 MG/DL (ref 6–20)
BUN/CREAT SERPL: 12.7 (ref 7–25)
CALCIUM SPEC-SCNC: 9.5 MG/DL (ref 8.6–10.5)
CHLORIDE SERPL-SCNC: 102 MMOL/L (ref 98–107)
CK SERPL-CCNC: 70 U/L (ref 20–180)
CLARITY UR: CLEAR
CO2 SERPL-SCNC: 22 MMOL/L (ref 22–29)
COLOR UR: YELLOW
CREAT SERPL-MCNC: 0.71 MG/DL (ref 0.57–1)
CRP SERPL-MCNC: <0.3 MG/DL (ref 0–0.5)
DEPRECATED RDW RBC AUTO: 39.1 FL (ref 37–54)
EGFRCR SERPLBLD CKD-EPI 2021: 125 ML/MIN/1.73
EOSINOPHIL # BLD AUTO: 0.08 10*3/MM3 (ref 0–0.4)
EOSINOPHIL NFR BLD AUTO: 1.6 % (ref 0.3–6.2)
ERYTHROCYTE [DISTWIDTH] IN BLOOD BY AUTOMATED COUNT: 12 % (ref 12.3–15.4)
GLOBULIN UR ELPH-MCNC: 3.5 GM/DL
GLUCOSE SERPL-MCNC: 88 MG/DL (ref 65–99)
GLUCOSE UR STRIP-MCNC: NEGATIVE MG/DL
HAV IGM SERPL QL IA: NORMAL
HBV CORE IGM SERPL QL IA: NORMAL
HBV SURFACE AG SERPL QL IA: NORMAL
HCT VFR BLD AUTO: 42.1 % (ref 34–46.6)
HCV AB SER QL: NORMAL
HGB BLD-MCNC: 14.4 G/DL (ref 12–15.9)
HGB UR QL STRIP.AUTO: NEGATIVE
HIV 1+2 AB+HIV1 P24 AG SERPL QL IA: NORMAL
HYALINE CASTS UR QL AUTO: ABNORMAL /LPF
IMM GRANULOCYTES # BLD AUTO: 0.02 10*3/MM3 (ref 0–0.05)
IMM GRANULOCYTES NFR BLD AUTO: 0.4 % (ref 0–0.5)
KETONES UR QL STRIP: ABNORMAL
LEUKOCYTE ESTERASE UR QL STRIP.AUTO: ABNORMAL
LYMPHOCYTES # BLD AUTO: 1.96 10*3/MM3 (ref 0.7–3.1)
LYMPHOCYTES NFR BLD AUTO: 40.2 % (ref 19.6–45.3)
MCH RBC QN AUTO: 30.7 PG (ref 26.6–33)
MCHC RBC AUTO-ENTMCNC: 34.2 G/DL (ref 31.5–35.7)
MCV RBC AUTO: 89.8 FL (ref 79–97)
MONOCYTES # BLD AUTO: 0.4 10*3/MM3 (ref 0.1–0.9)
MONOCYTES NFR BLD AUTO: 8.2 % (ref 5–12)
NEUTROPHILS NFR BLD AUTO: 2.39 10*3/MM3 (ref 1.7–7)
NEUTROPHILS NFR BLD AUTO: 49.2 % (ref 42.7–76)
NITRITE UR QL STRIP: NEGATIVE
NRBC BLD AUTO-RTO: 0 /100 WBC (ref 0–0.2)
PH UR STRIP.AUTO: 6.5 [PH] (ref 5–8)
PLATELET # BLD AUTO: 291 10*3/MM3 (ref 140–450)
PMV BLD AUTO: 10.1 FL (ref 6–12)
POTASSIUM SERPL-SCNC: 3.9 MMOL/L (ref 3.5–5.2)
PROT SERPL-MCNC: 7.8 G/DL (ref 6–8.5)
PROT UR QL STRIP: ABNORMAL
RBC # BLD AUTO: 4.69 10*6/MM3 (ref 3.77–5.28)
RBC # UR STRIP: ABNORMAL /HPF
REF LAB TEST METHOD: ABNORMAL
SODIUM SERPL-SCNC: 137 MMOL/L (ref 136–145)
SP GR UR STRIP: 1.02 (ref 1–1.03)
SQUAMOUS #/AREA URNS HPF: ABNORMAL /HPF
TSH SERPL DL<=0.05 MIU/L-ACNC: 6.71 UIU/ML (ref 0.27–4.2)
UROBILINOGEN UR QL STRIP: ABNORMAL
WBC # UR STRIP: ABNORMAL /HPF
WBC NRBC COR # BLD AUTO: 4.87 10*3/MM3 (ref 3.4–10.8)

## 2025-03-18 PROCEDURE — 86037 ANCA TITER EACH ANTIBODY: CPT

## 2025-03-18 PROCEDURE — 82550 ASSAY OF CK (CPK): CPT

## 2025-03-18 PROCEDURE — 86376 MICROSOMAL ANTIBODY EACH: CPT

## 2025-03-18 PROCEDURE — 81001 URINALYSIS AUTO W/SCOPE: CPT

## 2025-03-18 PROCEDURE — 80053 COMPREHEN METABOLIC PANEL: CPT

## 2025-03-18 PROCEDURE — 1159F MED LIST DOCD IN RCRD: CPT | Performed by: INTERNAL MEDICINE

## 2025-03-18 PROCEDURE — 82746 ASSAY OF FOLIC ACID SERUM: CPT

## 2025-03-18 PROCEDURE — 86235 NUCLEAR ANTIGEN ANTIBODY: CPT

## 2025-03-18 PROCEDURE — 86038 ANTINUCLEAR ANTIBODIES: CPT

## 2025-03-18 PROCEDURE — 86800 THYROGLOBULIN ANTIBODY: CPT

## 2025-03-18 PROCEDURE — 83516 IMMUNOASSAY NONANTIBODY: CPT

## 2025-03-18 PROCEDURE — 82784 ASSAY IGA/IGD/IGG/IGM EACH: CPT

## 2025-03-18 PROCEDURE — 86041 ACETYLCHOLN RCPTR BNDNG ANTB: CPT

## 2025-03-18 PROCEDURE — 86053 AQAPRN-4 ANTB FLO CYTMTRY EA: CPT

## 2025-03-18 PROCEDURE — 36415 COLL VENOUS BLD VENIPUNCTURE: CPT

## 2025-03-18 PROCEDURE — 87086 URINE CULTURE/COLONY COUNT: CPT

## 2025-03-18 PROCEDURE — 86258 DGP ANTIBODY EACH IG CLASS: CPT

## 2025-03-18 PROCEDURE — 82607 VITAMIN B-12: CPT

## 2025-03-18 PROCEDURE — 86225 DNA ANTIBODY NATIVE: CPT

## 2025-03-18 PROCEDURE — 99204 OFFICE O/P NEW MOD 45 MIN: CPT | Performed by: INTERNAL MEDICINE

## 2025-03-18 PROCEDURE — 86618 LYME DISEASE ANTIBODY: CPT

## 2025-03-18 PROCEDURE — 80074 ACUTE HEPATITIS PANEL: CPT

## 2025-03-18 PROCEDURE — 86146 BETA-2 GLYCOPROTEIN ANTIBODY: CPT

## 2025-03-18 PROCEDURE — 85732 THROMBOPLASTIN TIME PARTIAL: CPT

## 2025-03-18 PROCEDURE — 86160 COMPLEMENT ANTIGEN: CPT

## 2025-03-18 PROCEDURE — 84443 ASSAY THYROID STIM HORMONE: CPT

## 2025-03-18 PROCEDURE — G0432 EIA HIV-1/HIV-2 SCREEN: HCPCS

## 2025-03-18 PROCEDURE — 85025 COMPLETE CBC W/AUTO DIFF WBC: CPT

## 2025-03-18 PROCEDURE — 86043 ACETYLCHOLN RCPTR MODLG ANTB: CPT

## 2025-03-18 PROCEDURE — 85652 RBC SED RATE AUTOMATED: CPT

## 2025-03-18 PROCEDURE — 1160F RVW MEDS BY RX/DR IN RCRD: CPT | Performed by: INTERNAL MEDICINE

## 2025-03-18 PROCEDURE — 86042 ACETYLCHOLN RCPTR BLCKG ANTB: CPT

## 2025-03-18 PROCEDURE — 86147 CARDIOLIPIN ANTIBODY EA IG: CPT

## 2025-03-18 PROCEDURE — 86140 C-REACTIVE PROTEIN: CPT

## 2025-03-18 PROCEDURE — 86364 TISS TRNSGLTMNASE EA IG CLAS: CPT

## 2025-03-18 PROCEDURE — 86362 MOG-IGG1 ANTB CBA EACH: CPT

## 2025-03-18 PROCEDURE — 86592 SYPHILIS TEST NON-TREP QUAL: CPT

## 2025-03-18 PROCEDURE — 81374 HLA I TYPING 1 ANTIGEN LR: CPT

## 2025-03-18 PROCEDURE — 85613 RUSSELL VIPER VENOM DILUTED: CPT

## 2025-03-18 RX ORDER — NORETHINDRONE ACETATE AND ETHINYL ESTRADIOL AND FERROUS FUMARATE 1MG-20(21)
1 KIT ORAL DAILY
COMMUNITY
Start: 2025-02-24

## 2025-03-18 NOTE — PATIENT INSTRUCTIONS
Antinuclear Antibody Test    Why am I having this test?  This is a test that is used to help diagnose systemic lupus erythematosus (SLE) and other autoimmune diseases. An autoimmune disease is a disease in which the body's own defense system (immune system) attacks its organs.  What is being tested?  This test checks for antinuclear antibodies (BHAVESH) in the blood. The presence of BHAVESH is associated with several autoimmune diseases. It is seen in almost all people with lupus.  What kind of sample is taken?    A blood sample is required for this test. It is usually collected by inserting a needle into a blood vessel.  How are the results reported?  Your test results will be reported as either positive or negative.  What do the results mean?  A positive test result may mean that you have:  Lupus.  Other autoimmune diseases, such as rheumatoid arthritis, scleroderma, or Sjögren syndrome.  Talk with your health care provider about what your results mean. In some cases, your health care provider may do more testing to confirm the results. More testing may be done because other conditions can sometimes cause a positive result, such as:  Liver dysfunction.  Myasthenia gravis.  Infectious mononucleosis.  Questions to ask your health care provider  Ask your health care provider, or the department that is doing the test:  When will my results be ready?  How will I get my results?  What are my treatment options?  What other tests do I need?  What are my next steps?  Summary  This is a test that is used to help diagnose systemic lupus erythematosus (SLE) and other autoimmune diseases. An autoimmune disease is a disease in which the body's own defense system (immune system) attacks the body.  This test checks for antinuclear antibodies (BHAVESH) in the blood. The presence of BHAVESH is associated with several autoimmune diseases. It is seen in almost all people with lupus.  Your test results will be reported as either positive or negative.  Talk with your health care provider about what your results mean.  This information is not intended to replace advice given to you by your health care provider. Make sure you discuss any questions you have with your health care provider.  Document Revised: 08/21/2022 Document Reviewed: 08/21/2022  Elsevier Patient Education © 2024 [employer]

## 2025-03-18 NOTE — PROGRESS NOTES
Office Visit       Date: 03/18/2025   Patient Name: Prashanth Varela  MRN: [id number]  YOB: 2005    Referring Physician: Caro Batista*     Chief Complaint:   Chief Complaint   Patient presents with    Abnormal Lab     Positive BHAVESH    Raynaud's Phenomenon       History of Present Illness: Prashanth Varela is a 19 y.o. female with history of anxiety, precocious puberty who is here today in consultation from her PCP for positive BHAVESH and Raynaud's    History of Present Illness  She reports frequent episodes of dizziness, lightheadedness, and shaking during anxiety episodes. She has a history of costochondritis, which occasionally cause difficulty in breathing. Despite two cardiology consultations, no significant findings were found. A chest x-ray was performed last year showing normal findings. She also reports low iron levels and occasional irritation bumps in her mouth or nose. She experiences color changes in her fingers during cold weather, leading to a suspicion of Raynaud's disease. She does not have ulcers on her fingertips but reports intermittent severe leg pain and muscle tenderness. She experiences morning stiffness and neck tenderness, which may be related to a recent massage. She has not tried any medications for these symptoms. She was prescribed methocarbamol for her chest, but she did not take it. She was given Voltaren cream, but it burned her skin.     She has a history of precocious puberty and a possible pituitary tumor previously previously followed by a pediatric endocrinologist with [facility] in [place].  She was on Lupron for years as a child. She has not had any brain imaging since childhood.    She has a history of rashes and chronic morning vomiting for 10 years, which were unexplained.  She previously used marijuana but has tried to cut this out    She has a history of anxiety, which have been managed with various medications without  success. She discontinued counseling due to insurance loss. She reports no depression. She has tried hydroxyzine for anxiety, but it did not work.    She experiences dizziness and weakness, often feeling the need to sit down. She experiences numbness and tingling in her left arm, occasionally in her right arm, and chest pain. She has quit smoking marijuana due to paranoia but continues to experience chest pain. She wakes up at 5 AM feeling nauseous.      SOCIAL HISTORY  She has quit smoking marijuana due to paranoia.    FAMILY HISTORY  Her mother is adopted and does not have information about her biological family's medical history.    MEDICATIONS  Current: Voltaren cream.  Discontinued: Hydroxyzine.      Records reviewed:  -Chest x-ray 9/24/2024: No acute findings  -Labs 7/15/2024: Positive BHAVESH 1:640, negative rheumatoid factor, negative CCP antibody, negative hepatitis C antibody, normal sed rate, normal CRP, normal CBC, normal CMP, normal hemoglobin A1c, normal TSH    Subjective     Review of Systems: Review of Systems   Constitutional:  Positive for diaphoresis and fatigue. Negative for chills, fever and unexpected weight loss.   HENT:  Positive for tinnitus. Negative for mouth sores, sinus pressure and sore throat.    Eyes:  Negative for pain and redness.   Respiratory:  Positive for shortness of breath. Negative for cough.    Cardiovascular:  Positive for chest pain and palpitations.   Gastrointestinal:  Positive for nausea and vomiting. Negative for abdominal pain, blood in stool, diarrhea and GERD.   Endocrine: Negative for polydipsia and polyuria.   Genitourinary:  Negative for dysuria, genital sores and hematuria.   Musculoskeletal:  Negative for arthralgias, back pain, joint swelling, myalgias, neck pain and neck stiffness.   Skin:  Positive for pallor. Negative for rash and bruise.   Neurological:  Positive for dizziness, tremors, weakness, light-headedness, headache, memory problem and confusion.  "Negative for seizures and numbness.   Hematological:  Negative for adenopathy. Does not bruise/bleed easily.   Psychiatric/Behavioral:  Negative for depressed mood. The patient is nervous/anxious.         Past Medical History:   Past Medical History:   Diagnosis Date    Anxiety 07/15/2024    H/O iron deficiency 07/15/2024       Past Surgical History: History reviewed. No pertinent surgical history.    Family History:   Family History   Problem Relation Age of Onset    Melanoma Mother     No Known Problems Father     Crohn's disease Sister     Ulcerative colitis Sister     Diabetes Paternal Grandmother     Diabetes Paternal Grandfather        Social History:   Social History     Socioeconomic History    Marital status: Single   Tobacco Use    Smoking status: Never     Passive exposure: Never    Smokeless tobacco: Never   Vaping Use    Vaping status: Never Used   Substance and Sexual Activity    Alcohol use: Never    Drug use: Yes     Types: Marijuana    Sexual activity: Never       Medications:   Current Outpatient Medications:     Meggan FE 1/20 1-20 MG-MCG per tablet, Take 1 tablet by mouth Daily., Disp: , Rfl:     Allergies: No Known Allergies      Objective      Vital Signs:   Vitals:    03/18/25 1500   BP: 118/76   Pulse: 76   Temp: 97.9 °F (36.6 °C)   Weight: 58 kg (127 lb 12.8 oz)   Height: 162.6 cm (64\")   PainSc: 5      Body mass index is 21.94 kg/m².       Physical Exam:  Physical Exam   MUSCULOSKELETAL:   No peripheral synovitis.  No dactylitis.  No pitting the nails.  Normal capillaroscopy.  No joint deformities.  No rheumatoid nodules or tophi.    Complete joint exam was performed including the MCPs, PIPs, DIPs of the hands, wrists, elbows, shoulders, hips, knees and ankles.  No soft tissue swelling or tenderness is present except as above.    General: The patient is well-developed and well nourished. Cooperative, alert and oriented. Affect is anxious.  Hydration appears normal.   HEENT: Normocephalic " "and atraumatic. Lids and conjunctiva are normal. Pupils are equal and sclera are clear. Oropharynx is clear   NECK neck is supple without adenopathy, masses or thyromegaly.   CARDIOVASCULAR: Regular rate and rhythm. No murmurs, rubs or gallops   LUNGS: Effort is normal. Lungs are clear bilateral   ABDOMEN: Not examined  EXTREMITIES: Peripheral pulses are intact. No clubbing.   SKIN: No rashes. No subcutaneous nodules. No digital ulcers. No sclerodactyly.   NEUROLOGIC: Gait is normal. Strength testing is normal.  No focal neurologic deficits    Results Review:   Labs:    Lab Results   Component Value Date    GLUCOSE 87 07/15/2024    BUN 8 07/15/2024    CREATININE 0.73 07/15/2024    EGFR 121 07/15/2024    BCR 11 07/15/2024    K 4.1 07/15/2024    CO2 22 07/15/2024    CALCIUM 9.7 07/15/2024    ALBUMIN 4.6 07/15/2024    BILITOT 0.5 07/15/2024    AST 19 07/15/2024    ALT 18 07/15/2024     Lab Results   Component Value Date    WBC 6.1 07/15/2024    HGB 13.5 07/15/2024    HCT 41.6 07/15/2024    MCV 96 07/15/2024     07/15/2024     Lab Results   Component Value Date    SEDRATE 2 07/15/2024     Lab Results   Component Value Date    CRP 5.1 03/30/2022     No results found for: \"QUANTIFERO\", \"QUANTITB1\", \"QUANTITB2\", \"QUANTIFERN\", \"QUANTIFERM\", \"QUANTITBGLDP\"  No results found for: \"RF\"  No results found for: \"HEPBSAG\", \"HEPAIGM\", \"HEPBIGMCORE\", \"HEPCVIRUSABY\"        Procedures    Assessment / Plan      1. Positive BHAVESH (antinuclear antibody)    2. Raynaud's phenomenon without gangrene    3. Numbness and tingling    4. Arthralgia of multiple sites    5. Other fatigue      -Chest x-ray 9/24/2024: No acute findings  -Labs 7/15/2024: Positive BHAVESH 1:640, negative rheumatoid factor, negative CCP antibody, negative hepatitis C antibody, normal sed rate, normal CRP, normal CBC, normal CMP, normal hemoglobin A1c, normal TSH      19 y.o. female with history of anxiety, marijuana abuse, precocious puberty who is here today in " consultation from her PCP for positive BHAVESH and Raynaud's.  She complains of numbness tingling and weakness and some arthralgias particular involving her back    Ddx: Fibromyalgia, OA, rheumatoid arthritis, psoriatic arthritis, dermatomyositis, polymyositis, Sjogren syndrome, scleroderma, vasculitis, lupus, connective tissue disease, ankylosing spondylitis, infectious diseases, paraneoplastic disease, demyelinating disease  Assessment & Plan  - Positive antinuclear antibody (BHAVESH) test.  The patient presents with a positive BHAVESH test, which is a nonspecific finding and does not necessarily indicate a disease.   Her rheumatoid arthritis markers were negative last summer, and there are no signs of rheumatoid arthritis clinically.   All blood counts, including white blood cell count, liver function, kidney function, and thyroid function, were normal last summer.   A chest x-ray conducted 9/4/2024 did not reveal any signs of sarcoidosis or other abnormalities.       Unspecified basis presently for isolated positive BHAVESH  At this time, she does not meet the criteria for a specific rheumatic inflammatory disease.   Markers for rheumatoid arthritis are negative.  There are no clinical features of rheumatoid arthritis, psoriatic arthritis, scleroderma, vasculitis, lupus or connective tissue disease.  No  malar rash, no oral/nasal ulcers, no pleurisy/pericarditis, no seizure disorder, no renal or hematologic abnormality.  No clotting disorder.  No photosensitivity.  No inflammatory arthritis    An BHAVESH test is a nonspecific test.  While it certainly can be positive in conditions like SLE, scleroderma, myositis, Sjogren's, RA, vasculitis, etc., it can also be positive in patients with thyroid disease, type 1 diabetes, psoriasis, celiac disease, inflammatory bowel disease, COPD/chronic lung disease, cancers etc.  There are also reports of normal/apparently healthy individuals who are incidentally found to have a positive BHAVESH test.   You can also frequently get a false positive BHAVESH test.  Positive BHAVESH results increased with age.  15% of patients over the age of 65 are BHAVESH positive, along with 5% of the general population.      Recommendations:  -Handout provided on positive BHAVESH test.  Discussed that positive BHAVESH test is present in 5% of the population, and does not indicate specific disease alone  -An updated MRI of the brain ordered to rule out demyelinating disease with her complaints of numbness and tingling and history of precocious puberty with question of possible pituitary tumor as a child.   -Additionally, an x-ray of the sacroiliac joints will be conducted today to rule out spondylarthritis.   -Extensive lab ordered as below for continued investigation of positive BHAVESH with Raynaud's    -Raynaud's phenomenon.  The patient reports that her fingers and legs turn blue or white when it is cold outside, suggesting Raynaud's phenomenon. No ulcers on her fingertips were noted. Further evaluation will be conducted through the ordered tests to rule out any underlying causes.  Recommend avoidance of cold, smoke, caffeine, stress which can exacerbate Raynaud's  Suspect primary Raynaud's at this time      -Anxiety.  The patient reports experiencing anxiety, dizziness, lightheadedness, and shaking during anxiety episodes. She has previously tried hydroxyzine for anxiety without success and has weaned off other medications due to ineffectiveness and side effects. She is advised to continue practicing deep breathing and focus techniques to manage anxiety.  -Suspect anxiety may be the cause of her dizziness, weakness, numbness and tingling complaints  -Follow-up with PCP and consider consulting again with mental health provider    -History of marijuana abuse  -Continue to avoid marijuana which may have been the source of her previous nausea and vomiting episodes    - Dizziness and weakness.  The patient reports episodes of dizziness and weakness,  sometimes feeling the need to sit down. These symptoms occur even without feeling anxious.   -Workup with cardiology reportedly unrevealing in 2024   -further labs and MRI brain evaluation will be conducted as noted below to rule out any underlying causes such as demyelinating disease.    - History of precocious puberty.  The patient has a history of precocious puberty as a child and ? of a pituitary tumor.    Previously followed with [facility] pediatric rheumatology years ago and previously treated with Lupron  An updated MRI of the brain ordered for further evaluation        Orders Placed This Encounter   Procedures    MRI Brain Without Contrast    XR Sacroiliac Joints 3+ View    BHAVESH by IFA, Reflex 9-biomarkers profile    ANCA Panel    CBC Auto Differential    Comprehensive Metabolic Panel    Beta-2 Glycoprotein Antibodies    Anticardiolipin Antibody, IgG / M, Qn    Lupus Anticoagulant Reflex    Hepatitis Panel, Acute    CK    TSH    Thyroid Antibodies    C-reactive Protein    Sedimentation Rate    Urinalysis With Culture If Indicated -    Celiac Panel Reflex To Titer    C4+C3    HLA-B27 Antigen    Lyme Disease Total Antibody With Reflex to Immunoassay    Vitamin B12 & Folate    HIV-1 & HIV-2 Antibodies    RPR, Rfx Qn RPR / Confirm TP    Neuromyelitis Optica (NMO) Auto Antibody, IgG    Anti-Myelin Oligodendrocyte Glycoprotein (MOG), Serum    Acetylcholine Receptor Antibody Panel     No orders of the defined types were placed in this encounter.         Overall low suspicion of systemic inflammatory rheumatic disease at this time to explain her positive BHAVESH finding.  As long as below rheumatologic labs and MRI brain and x-ray sacroiliac joints ordered today come back unremarkable beyond positive BHAVESH, I will have the patient return on an as-needed basis and continue to follow with their primary provider.  Patient understands that should they have any rheumatologic concerns in the future to give us a call and  I will be happy to re-evaluate.  It was a pleasure to see the patient in clinic today.  Thank you for allowing me to participate in the care of this patient  Her mother is present throughout the exam and discussion today    Follow Up:   Return if symptoms worsen or fail to improve.      Patient or patient representative verbalized consent for the use of Ambient Listening during the visit with  Jeff Aranda MD for chart documentation. 3/18/2025  16:41 EDT    TIME SPENT: I spent 46 minutes caring for the patient on this date of service.  This time includes time spent by me in the following activities: Preparing for the visit, obtaining records, reviewing/ordering tests and independently reviewing results, performing a medically appropriate history/exam, counseling and educating the patient/family/caregiver, ordering medications, tests, or procedures, and documenting information in the medical record.        Jeff Aranda MD  [facility]

## 2025-03-19 ENCOUNTER — RESULTS FOLLOW-UP (OUTPATIENT)
Facility: HOSPITAL | Age: 20
End: 2025-03-19
Payer: COMMERCIAL

## 2025-03-19 DIAGNOSIS — D35.2 PITUITARY MICROADENOMA: Primary | ICD-10-CM

## 2025-03-19 DIAGNOSIS — G93.0 CEREBRAL CYST: ICD-10-CM

## 2025-03-19 LAB
C3 SERPL-MCNC: 130 MG/DL (ref 82–167)
C4 SERPL-MCNC: 18 MG/DL (ref 14–44)
ERYTHROCYTE [SEDIMENTATION RATE] IN BLOOD: 13 MM/HR (ref 0–20)
FOLATE SERPL-MCNC: 6.85 NG/ML (ref 4.78–24.2)
VIT B12 BLD-MCNC: 384 PG/ML (ref 211–946)

## 2025-03-20 LAB
B BURGDOR IGG+IGM SER QL IA: NEGATIVE
B2 GLYCOPROT1 IGA SER-ACNC: <9 GPI IGA UNITS (ref 0–25)
B2 GLYCOPROT1 IGG SER-ACNC: <9 GPI IGG UNITS (ref 0–20)
B2 GLYCOPROT1 IGM SER-ACNC: <9 GPI IGM UNITS (ref 0–32)
BACTERIA SPEC AEROBE CULT: NO GROWTH
GLIADIN PEPTIDE IGA SER-ACNC: 7 UNITS (ref 0–19)
IGA SERPL-MCNC: 166 MG/DL (ref 87–352)
LA 2 SCREEN W REFLEX-IMP: NORMAL
RPR SER QL: NON REACTIVE
SCREEN APTT: 32.7 SEC (ref 0–43.5)
SCREEN DRVVT: 37.5 SEC (ref 0–47)
THYROGLOB AB SERPL-ACNC: <1 IU/ML (ref 0–0.9)
THYROPEROXIDASE AB SERPL-ACNC: 11 IU/ML (ref 0–34)
TTG IGA SER-ACNC: <2 U/ML (ref 0–3)

## 2025-03-21 LAB
ANA SER QL IF: POSITIVE
ANA SPECKLED TITR SER: NORMAL {TITER}
C-ANCA TITR SER IF: NORMAL TITER
CARDIOLIPIN IGG SER IA-ACNC: <9 GPL U/ML (ref 0–14)
CARDIOLIPIN IGM SER IA-ACNC: <9 MPL U/ML (ref 0–12)
CENTROMERE B AB SER-ACNC: <0.2 AI (ref 0–0.9)
CHROMATIN AB SERPL-ACNC: <0.2 AI (ref 0–0.9)
DSDNA AB SER-ACNC: <1 IU/ML (ref 0–9)
ENA JO1 AB SER-ACNC: <0.2 AI (ref 0–0.9)
ENA RNP AB SER-ACNC: <0.2 AI (ref 0–0.9)
ENA SCL70 AB SER-ACNC: <0.2 AI (ref 0–0.9)
ENA SM AB SER-ACNC: <0.2 AI (ref 0–0.9)
ENA SS-A AB SER-ACNC: <0.2 AI (ref 0–0.9)
ENA SS-B AB SER-ACNC: <0.2 AI (ref 0–0.9)
LABORATORY COMMENT REPORT: ABNORMAL
Lab: NORMAL
Lab: NORMAL
MYELOPEROXIDASE AB SER IA-ACNC: <0.2 UNITS (ref 0–0.9)
P-ANCA ATYPICAL TITR SER IF: NORMAL TITER
P-ANCA TITR SER IF: NORMAL TITER
PROTEINASE3 AB SER IA-ACNC: <0.2 UNITS (ref 0–0.9)

## 2025-03-23 LAB — MOG AB SER QL CBA IFA: NEGATIVE

## 2025-03-26 LAB — AQP4 H2O CHANNEL IGG SERPL QL: NEGATIVE

## 2025-03-27 LAB — HLA-B27 QL NAA+PROBE: NEGATIVE

## 2025-03-27 NOTE — TELEPHONE ENCOUNTER
I called [facility] in [place] @ [phone].  I spoke to Glenn in Med Rec.  He said they only keep records for about 10 years, but he would look to see if they have it and fax it to us.  If he's unable to locate it, he will let us know that as well. I gave him both our office number and fax number. -Saige Tellez, PHYLLISA

## 2025-03-30 LAB
ACHR BIND AB SER-SCNC: 0.04 NMOL/L (ref 0–0.24)
ACHR BLOCK AB SER-ACNC: 16 % (ref 0–25)
ACHR MOD AB SER QL FC: 0 % (ref 0–45)

## 2025-04-01 ENCOUNTER — TELEPHONE (OUTPATIENT)
Age: 20
End: 2025-04-01
Payer: COMMERCIAL

## 2025-04-01 ENCOUNTER — HOSPITAL ENCOUNTER (OUTPATIENT)
Dept: MRI IMAGING | Facility: HOSPITAL | Age: 20
Discharge: HOME | End: 2025-04-01
Admitting: INTERNAL MEDICINE
Payer: COMMERCIAL

## 2025-04-01 DIAGNOSIS — R76.8 POSITIVE ANA (ANTINUCLEAR ANTIBODY): ICD-10-CM

## 2025-04-01 PROCEDURE — A9577 INJ MULTIHANCE: HCPCS | Performed by: INTERNAL MEDICINE

## 2025-04-01 PROCEDURE — 70553 MRI BRAIN STEM W/O & W/DYE: CPT

## 2025-04-01 PROCEDURE — 25510000002 GADOBENATE DIMEGLUMINE 529 MG/ML SOLUTION: Performed by: INTERNAL MEDICINE

## 2025-04-01 RX ADMIN — GADOBENATE DIMEGLUMINE 10 ML: 529 INJECTION, SOLUTION INTRAVENOUS at 17:49

## 2025-04-01 NOTE — TELEPHONE ENCOUNTER
Our office received a call from [facility] with [facility]. She called regarding questions for Dr. Jeff Aranda on if the patient needed an MRI with or without contrast. 1st call came in at 2:25pm and patients appointment was on 04/01/25 at 4:45pm.     Upon review Dr. Aranda determined patient should have with contrast. The radiologist is going to stay after 4:30pm so patient can have her MRI completed. CPT codes have been updated and patient should have her scan today. Dr. Aranda has been updated.

## 2025-04-30 ENCOUNTER — OFFICE VISIT (OUTPATIENT)
Dept: NEUROSURGERY | Facility: CLINIC | Age: 20
End: 2025-04-30
Payer: COMMERCIAL

## 2025-04-30 VITALS — HEIGHT: 64 IN | WEIGHT: 128.5 LBS | TEMPERATURE: 97.5 F | BODY MASS INDEX: 21.94 KG/M2

## 2025-04-30 DIAGNOSIS — G93.0 ARACHNOID CYST: ICD-10-CM

## 2025-04-30 DIAGNOSIS — D35.2 PITUITARY MICROADENOMA: ICD-10-CM

## 2025-04-30 DIAGNOSIS — G44.89 HEADACHE SYNDROME: Primary | ICD-10-CM

## 2025-04-30 PROCEDURE — 1160F RVW MEDS BY RX/DR IN RCRD: CPT | Performed by: NEUROLOGICAL SURGERY

## 2025-04-30 PROCEDURE — 1159F MED LIST DOCD IN RCRD: CPT | Performed by: NEUROLOGICAL SURGERY

## 2025-04-30 PROCEDURE — 99204 OFFICE O/P NEW MOD 45 MIN: CPT | Performed by: NEUROLOGICAL SURGERY

## 2025-04-30 RX ORDER — TOPIRAMATE 25 MG/1
25 TABLET, FILM COATED ORAL DAILY
Qty: 30 TABLET | Refills: 2 | Status: SHIPPED | OUTPATIENT
Start: 2025-04-30

## 2025-04-30 NOTE — PROGRESS NOTES
Patient: Prashanth Varela  : 2005    Primary Care Provider: Caro Batista PA-C    Requesting Provider: As above          History of Present Illness  The patient is a 19-year-old female who presents for evaluation of headaches, hot flashes, and pituitary tumor. She is accompanied by her mother.    She has been experiencing new-onset headaches for the past 1 to 2 months, which are predominantly localized across her forehead.  Headaches are somewhat throbbing.  Sometimes she is light sensitive.  Ibuprofen has not been helpful.  She reports a single episode of visual disturbance characterized by an inability to see straight, but this is not a regular occurrence.    Additionally, she has been suffering from severe hot flashes, the etiology of which remains uncertain. Her medical history is significant for precocious puberty, diagnosed at the age of 6, characterized by advanced bone age, pubic hair development, and breast budding. She was initially treated with Lupron injections to halt these developments.  She was treated and then subsequently released by pediatric endocrinology in [place] as I understand it.  He has not had any recent MRIs since the age of 7.    She has a long-standing history of morning vomiting, persisting for approximately 11 years, which has been extensively investigated without a definitive diagnosis. Recent laboratory tests revealed elevated BHAVESH levels, prompting a referral to a rheumatologist. Further investigations, including blood work, MRI, and back x-ray, led to the discovery of a new pituitary tumor.     MEDICATIONS  Past: Lupron      Review of Systems   Constitutional:  Negative for activity change, appetite change, chills, diaphoresis, fatigue, fever and unexpected weight change.   HENT:  Negative for congestion, dental problem, drooling, ear discharge, ear pain, facial swelling, hearing loss, mouth sores, nosebleeds, postnasal drip, rhinorrhea, sinus pressure,  sinus pain, sneezing, sore throat, tinnitus, trouble swallowing and voice change.    Eyes:  Negative for photophobia, pain, discharge, redness, itching and visual disturbance.   Respiratory:  Negative for apnea, cough, choking, chest tightness, shortness of breath, wheezing and stridor.    Cardiovascular:  Negative for chest pain, palpitations and leg swelling.   Gastrointestinal:  Negative for abdominal distention, abdominal pain, anal bleeding, blood in stool, constipation, diarrhea, nausea, rectal pain and vomiting.   Endocrine: Negative for cold intolerance, heat intolerance, polydipsia, polyphagia and polyuria.   Genitourinary:  Negative for decreased urine volume, difficulty urinating, dyspareunia, dysuria, enuresis, flank pain, frequency, genital sores, hematuria, menstrual problem, pelvic pain, urgency, vaginal bleeding, vaginal discharge and vaginal pain.   Musculoskeletal:  Negative for arthralgias, back pain, gait problem, joint swelling, myalgias, neck pain and neck stiffness.   Skin:  Negative for color change, pallor, rash and wound.   Allergic/Immunologic: Negative for environmental allergies, food allergies and immunocompromised state.   Neurological:  Positive for headaches. Negative for dizziness, tremors, seizures, syncope, facial asymmetry, speech difficulty, weakness, light-headedness and numbness.   Hematological:  Negative for adenopathy. Does not bruise/bleed easily.   Psychiatric/Behavioral:  Negative for agitation, behavioral problems, confusion, decreased concentration, dysphoric mood, hallucinations, self-injury, sleep disturbance and suicidal ideas. The patient is not nervous/anxious and is not hyperactive.      The patient's past medical history, past surgical history, family history, and social history have been reviewed at length in the electronic medical record.      Physical Exam:   CONSTITUTIONAL: Patient is well-nourished, pleasant and appears stated age.  MUSCULOSKELETAL:  Neck  tenderness to palpation is not observed.   ROM in the neck is normal.  NEUROLOGICAL:  Orientation, memory, attention span, language function, and cognition have been examined and are intact.  Strength is intact in the upper and lower extremities to direct testing.  Muscle tone is normal throughout.  Station and gait are normal.  Sensation is intact to light touch testing throughout.  Deep tendon reflexes are 1+ and symmetrical.  Dimple's Sign is negative bilaterally. No clonus is elicited.  Coordination is intact.  CRANIAL NERVES:  Cranial Nerve II:   Visual fields are full to confrontation.  Cranial Nerve III, IV, and VI: PERRLADC. Extraocular movements are intact.  Nystagmus is not present.  Cranial Nerve V: Facial sensation is intact to light touch.  Cranial Nerve VII: Muscles of facial expression demonstate no weakness or asymmetry.  Cranial Nerve VIII: Hearing is intact to finger rub bilaterally.  Cranial Nerve IX and X: Palate elevates symmetrically.  Cranial Nerve XI: Shoulder shrug is intact bilaterally.  Cranial Nerve XII: Tongue is midline without evidence of atrophy or fasciculation.    Medical Decision Making    Data Review:   (All imaging studies were personally reviewed unless stated otherwise)  Results  MRI of the brain dated 4/1/2025 demonstrates a rounded area of diminished enhancement within the pituitary gland.  The pituitary stalk is unencumbered, midline.  The optic apparatus is completely unencumbered.  CSF intensity collection of fluid is noted dorsal to the cerebellum.    The report for an MRI of the brain dated 2004 suggested communication of the fourth ventricle to a posterior fossa cystic structure.  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      Diagnosis:   1.  Incidental posterior fossa cyst.  2.  Left sided pituitary lesion.  This potentially could represent a microadenoma.    Treatment Options:   I started the patient on Topamax 25 mg daily.  Prior to follow-up with  me in several weeks I am going to check a battery of endocrine studies to check her endocrine axis.  If headaches persist then neurology referral will be considered.  She will probably require periodic MRI imaging to follow the lesion within her pituitary gland.      Scribed for Stefan Miller MD by Sandi Smith CMA on 4/30/2025 11:48 EDT       Patient or patient representative verbalized consent for the use of Ambient Listening during the visit with  Stefan Miller MD for chart documentation. 4/30/2025  11:27 EDT    I, Dr. Miller, personally performed the services described in the documentation, as scribed in my presence, and it is both accurate and complete.

## 2025-05-15 ENCOUNTER — OFFICE VISIT (OUTPATIENT)
Dept: FAMILY MEDICINE CLINIC | Facility: CLINIC | Age: 20
End: 2025-05-15
Payer: COMMERCIAL

## 2025-05-15 ENCOUNTER — TELEPHONE (OUTPATIENT)
Dept: FAMILY MEDICINE CLINIC | Facility: CLINIC | Age: 20
End: 2025-05-15
Payer: COMMERCIAL

## 2025-05-15 VITALS
WEIGHT: 129.9 LBS | HEIGHT: 64 IN | OXYGEN SATURATION: 98 % | DIASTOLIC BLOOD PRESSURE: 68 MMHG | SYSTOLIC BLOOD PRESSURE: 116 MMHG | HEART RATE: 77 BPM | BODY MASS INDEX: 22.18 KG/M2

## 2025-05-15 DIAGNOSIS — R76.8 POSITIVE ANA (ANTINUCLEAR ANTIBODY): ICD-10-CM

## 2025-05-15 DIAGNOSIS — R79.89 ELEVATED TSH: ICD-10-CM

## 2025-05-15 DIAGNOSIS — E23.7 PITUITARY LESION: Primary | ICD-10-CM

## 2025-05-15 PROCEDURE — 1159F MED LIST DOCD IN RCRD: CPT | Performed by: PHYSICIAN ASSISTANT

## 2025-05-15 PROCEDURE — 1160F RVW MEDS BY RX/DR IN RCRD: CPT | Performed by: PHYSICIAN ASSISTANT

## 2025-05-15 PROCEDURE — 1126F AMNT PAIN NOTED NONE PRSNT: CPT | Performed by: PHYSICIAN ASSISTANT

## 2025-05-15 PROCEDURE — 99214 OFFICE O/P EST MOD 30 MIN: CPT | Performed by: PHYSICIAN ASSISTANT

## 2025-05-15 RX ORDER — LEVOTHYROXINE SODIUM 25 UG/1
25 TABLET ORAL
Qty: 30 TABLET | Refills: 3 | Status: SHIPPED | OUTPATIENT
Start: 2025-05-15

## 2025-05-15 NOTE — PROGRESS NOTES
"Chief Complaint  Follow-up ( Rheumatologist is concerned about elevated thyroid levels, she also saw a neurologist where a pituitary lesion was identified on MRI, she needs further work up and needs referral to endocrinologist.  )    Subjective          Prashanth Varela presents to [facility]  History of Present Illness she recently has had an extensive workup and was referred to a rheumatologist who identified potential thyroid issue but she was having headaches and an MRI scan was obtained showed a possible pituitary tumor her TSH was elevated and the rheumatologist advised her to see her PCP for further intervention and referral to endocrinology.  She has noted weight gain and she is always tired and feels rundown.    Objective   Vital Signs:   /68 (BP Location: Left arm, Patient Position: Sitting, Cuff Size: Adult)   Pulse 77   Ht 162.6 cm (64.02\")   Wt 58.9 kg (129 lb 14.4 oz)   SpO2 98%   BMI 22.29 kg/m²     Body mass index is 22.29 kg/m².    Review of Systems   Constitutional:  Positive for fatigue and unexpected weight gain. Negative for chills and fever.   Respiratory:  Negative for cough and shortness of breath.    Cardiovascular:  Negative for chest pain and palpitations.   Musculoskeletal:  Positive for arthralgias and myalgias. Negative for back pain.   Neurological:  Positive for headache. Negative for dizziness.   Psychiatric/Behavioral:  Negative for depressed mood. The patient is not nervous/anxious.        Past History:  Medical History: has a past medical history of Anxiety (07/15/2024), H/O iron deficiency (07/15/2024), and Headache.   Surgical History: has no past surgical history on file.   Family History: family history includes Crohn's disease in her sister; Diabetes in her paternal grandfather and paternal grandmother; Melanoma in her mother; No Known Problems in her father; Ulcerative colitis in her sister.   Social History: reports that she has " never smoked. She has never been exposed to tobacco smoke. She has never used smokeless tobacco. She reports current drug use. Drug: Marijuana. She reports that she does not drink alcohol.      Current Outpatient Medications:     Meggan ZAMORA 1/20 1-20 MG-MCG per tablet, Take 1 tablet by mouth Daily., Disp: , Rfl:     levothyroxine (SYNTHROID, LEVOTHROID) 25 MCG tablet, Take 1 tablet by mouth Every Morning., Disp: 30 tablet, Rfl: 3    topiramate (Topamax) 25 MG tablet, Take 1 tablet by mouth Daily. (Patient not taking: Reported on 5/15/2025), Disp: 30 tablet, Rfl: 2  Allergies: Patient has no known allergies.    Physical Exam  Constitutional:       Appearance: Normal appearance.   Neurological:      Mental Status: She is alert and oriented to person, place, and time.   Psychiatric:         Behavior: Behavior normal.             Assessment and Plan   Diagnoses and all orders for this visit:    1. Pituitary lesion (Primary)  Assessment & Plan:  I am going to get some baseline labs as suggested by the neurologist and refer her to endocrinology for more in-depth evaluation and treatment.    Orders:  -     Cancel: FSH & LH; Future  -     Cancel: Prolactin; Future  -     Cancel: Growth Hormone; Future  -     Cancel: ACTH; Future  -     ACTH; Future  -     FSH & LH; Future  -     Growth Hormone; Future  -     Prolactin; Future  -     Prolactin  -     Growth Hormone  -     FSH & LH  -     ACTH  -     Triiodothyronine (T3) Total & Free; Future  -     Thyroid Antibodies; Future  -     Thyroid Antibodies  -     Triiodothyronine (T3) Total & Free    2. Elevated TSH  Assessment & Plan:  I am going to put her on a low-dose levothyroxine while all of this is being further evaluated.    Orders:  -     US Thyroid; Future  -     Ambulatory Referral to Endocrinology  -     Cancel: T4, Free; Future  -     Cancel: Triiodothyronine (T3) Total & Free; Future  -     Cancel: Thyroid Antibodies; Future  -     T4, Free; Future  -     T4, Free  -      Cancel: Thyroid Antibodies  -     Cancel: Triiodothyronine (T3) Total & Free  -     Triiodothyronine (T3) Total & Free; Future  -     Thyroid Antibodies; Future  -     Thyroid Antibodies  -     Triiodothyronine (T3) Total & Free    3. Positive BHAVESH (antinuclear antibody)  Assessment & Plan:  The just felt like this was not related to any rheumatologic problem.      Other orders  -     levothyroxine (SYNTHROID, LEVOTHROID) 25 MCG tablet; Take 1 tablet by mouth Every Morning.  Dispense: 30 tablet; Refill: 3            Follow Up   No follow-ups on file.  Patient was given instructions and counseling regarding her condition or for health maintenance advice. Please see specific information pulled into the AVS if appropriate.       Heather Dai PA-C

## 2025-05-15 NOTE — TELEPHONE ENCOUNTER
HUB TO RELAY    PATIENT DOES NOT NEED TO GO TO [facility] FOR ADDITIONAL TESTING, WE WERE ABLE TO RUN ALL TESTS.

## 2025-05-15 NOTE — ASSESSMENT & PLAN NOTE
I am going to get some baseline labs as suggested by the neurologist and refer her to endocrinology for more in-depth evaluation and treatment.

## 2025-05-20 LAB
ACTH PLAS-MCNC: 17.3 PG/ML (ref 7.2–63.3)
FSH SERPL-ACNC: 6.8 MIU/ML
GH SERPL-MCNC: 2.1 NG/ML (ref 0–10)
LH SERPL-ACNC: 2.3 MIU/ML
PROLACTIN SERPL-MCNC: 24.7 NG/ML (ref 4.8–33.4)
T3 SERPL-MCNC: 158 NG/DL (ref 71–180)
T3FREE SERPL-MCNC: 3.8 PG/ML (ref 2–4.4)
T4 FREE SERPL-MCNC: 1.31 NG/DL (ref 0.82–1.77)
THYROGLOB AB SERPL-ACNC: <1 IU/ML (ref 0–0.9)
THYROPEROXIDASE AB SERPL-ACNC: <9 IU/ML (ref 0–34)

## 2025-05-21 ENCOUNTER — RESULTS FOLLOW-UP (OUTPATIENT)
Dept: FAMILY MEDICINE CLINIC | Facility: CLINIC | Age: 20
End: 2025-05-21
Payer: COMMERCIAL

## 2025-05-21 NOTE — TELEPHONE ENCOUNTER
"*HUB TO RELAY*    Pt's pcp states:    \"All of her thyroid functions and hormone levels, were normal.\"  "

## 2025-08-27 ENCOUNTER — OFFICE VISIT (OUTPATIENT)
Dept: ENDOCRINOLOGY | Facility: CLINIC | Age: 20
End: 2025-08-27
Payer: COMMERCIAL

## 2025-08-27 VITALS
DIASTOLIC BLOOD PRESSURE: 72 MMHG | SYSTOLIC BLOOD PRESSURE: 122 MMHG | OXYGEN SATURATION: 99 % | WEIGHT: 132.5 LBS | HEART RATE: 72 BPM | BODY MASS INDEX: 22.62 KG/M2 | HEIGHT: 64 IN

## 2025-08-27 DIAGNOSIS — R79.89 ELEVATED TSH: Primary | ICD-10-CM

## 2025-08-27 DIAGNOSIS — G47.9 SLEEP DISTURBANCE: ICD-10-CM

## 2025-08-27 DIAGNOSIS — E23.7 PITUITARY LESION: ICD-10-CM

## 2025-08-27 DIAGNOSIS — E53.9 VITAMIN B DEFICIENCY: ICD-10-CM

## 2025-08-27 DIAGNOSIS — E55.9 VITAMIN D DEFICIENCY: ICD-10-CM

## 2025-08-27 DIAGNOSIS — K29.50 CHRONIC GASTRITIS WITHOUT BLEEDING, UNSPECIFIED GASTRITIS TYPE: ICD-10-CM

## 2025-08-27 PROCEDURE — 99204 OFFICE O/P NEW MOD 45 MIN: CPT | Performed by: PHYSICIAN ASSISTANT

## 2025-08-27 PROCEDURE — 1159F MED LIST DOCD IN RCRD: CPT | Performed by: PHYSICIAN ASSISTANT

## 2025-08-27 PROCEDURE — 1160F RVW MEDS BY RX/DR IN RCRD: CPT | Performed by: PHYSICIAN ASSISTANT

## 2025-08-27 RX ORDER — CYANOCOBALAMIN 1000 UG/ML
1000 INJECTION, SOLUTION INTRAMUSCULAR; SUBCUTANEOUS
Qty: 1 ML | Refills: 3 | Status: SHIPPED | OUTPATIENT
Start: 2025-08-27